# Patient Record
Sex: MALE | Race: WHITE | Employment: FULL TIME | URBAN - NONMETROPOLITAN AREA
[De-identification: names, ages, dates, MRNs, and addresses within clinical notes are randomized per-mention and may not be internally consistent; named-entity substitution may affect disease eponyms.]

---

## 2022-11-05 ENCOUNTER — APPOINTMENT (OUTPATIENT)
Dept: CT IMAGING | Age: 42
End: 2022-11-05
Payer: COMMERCIAL

## 2022-11-05 ENCOUNTER — APPOINTMENT (OUTPATIENT)
Dept: GENERAL RADIOLOGY | Age: 42
End: 2022-11-05
Payer: COMMERCIAL

## 2022-11-05 ENCOUNTER — HOSPITAL ENCOUNTER (EMERGENCY)
Age: 42
Discharge: ANOTHER HOSPITAL | End: 2022-11-05
Attending: EMERGENCY MEDICINE
Payer: COMMERCIAL

## 2022-11-05 VITALS
RESPIRATION RATE: 14 BRPM | OXYGEN SATURATION: 100 % | WEIGHT: 260 LBS | SYSTOLIC BLOOD PRESSURE: 103 MMHG | HEART RATE: 141 BPM | DIASTOLIC BLOOD PRESSURE: 80 MMHG

## 2022-11-05 DIAGNOSIS — S02.85XA CLOSED FRACTURE OF ORBIT, INITIAL ENCOUNTER (HCC): ICD-10-CM

## 2022-11-05 DIAGNOSIS — S72.142A CLOSED 2-PART INTERTROCHANTERIC FRACTURE OF LEFT FEMUR, INITIAL ENCOUNTER (HCC): ICD-10-CM

## 2022-11-05 DIAGNOSIS — J98.2 PNEUMOMEDIASTINUM (HCC): ICD-10-CM

## 2022-11-05 DIAGNOSIS — S72.92XB TYPE I OR II OPEN FRACTURE OF LEFT FEMUR, UNSPECIFIED FRACTURE MORPHOLOGY, UNSPECIFIED PORTION OF FEMUR, INITIAL ENCOUNTER (HCC): ICD-10-CM

## 2022-11-05 DIAGNOSIS — H05.239 RETROBULBAR HEMORRHAGE: ICD-10-CM
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
## 2022-11-05 DIAGNOSIS — K66.1 HEMOPERITONEUM: ICD-10-CM

## 2022-11-05 DIAGNOSIS — R57.8 HEMORRHAGIC SHOCK (HCC): ICD-10-CM

## 2022-11-05 LAB
ABO/RH: NORMAL
ALBUMIN SERPL-MCNC: 3.2 G/DL (ref 3.5–5.2)
ALP BLD-CCNC: 71 U/L (ref 40–130)
ALT SERPL-CCNC: 322 U/L (ref 5–41)
ANION GAP SERPL CALCULATED.3IONS-SCNC: 15 MMOL/L (ref 7–19)
ANTIBODY SCREEN: NORMAL
APTT: 26.6 SEC (ref 26–36.2)
AST SERPL-CCNC: 304 U/L (ref 5–40)
BILIRUB SERPL-MCNC: 0.5 MG/DL (ref 0.2–1.2)
BLOOD BANK DISPENSE STATUS: NORMAL
BLOOD BANK PRODUCT CODE: NORMAL
BPU ID: NORMAL
BUN BLDV-MCNC: 21 MG/DL (ref 6–20)
CALCIUM SERPL-MCNC: 8.3 MG/DL (ref 8.6–10)
CHLORIDE BLD-SCNC: 104 MMOL/L (ref 98–111)
CO2: 20 MMOL/L (ref 22–29)
CREAT SERPL-MCNC: 1.8 MG/DL (ref 0.5–1.2)
DESCRIPTION BLOOD BANK: NORMAL
GFR SERPL CREATININE-BSD FRML MDRD: 47 ML/MIN/{1.73_M2}
GLUCOSE BLD-MCNC: 224 MG/DL (ref 74–109)
HCT VFR BLD CALC: 41.8 % (ref 42–52)
HEMOGLOBIN: 14.2 G/DL (ref 14–18)
INR BLD: 1.2 (ref 0.88–1.18)
MCH RBC QN AUTO: 30.7 PG (ref 27–31)
MCHC RBC AUTO-ENTMCNC: 34 G/DL (ref 33–37)
MCV RBC AUTO: 90.3 FL (ref 80–94)
PDW BLD-RTO: 12.6 % (ref 11.5–14.5)
PLATELET # BLD: 331 K/UL (ref 130–400)
PMV BLD AUTO: 9.4 FL (ref 9.4–12.4)
POTASSIUM SERPL-SCNC: 3.3 MMOL/L (ref 3.5–5)
PROTHROMBIN TIME: 15.2 SEC (ref 12–14.6)
RBC # BLD: 4.63 M/UL (ref 4.7–6.1)
SARS-COV-2, NAAT: NOT DETECTED
SODIUM BLD-SCNC: 139 MMOL/L (ref 136–145)
TOTAL PROTEIN: 5.3 G/DL (ref 6.6–8.7)
TROPONIN: 0.03 NG/ML (ref 0–0.03)
WBC # BLD: 34.2 K/UL (ref 4.8–10.8)

## 2022-11-05 PROCEDURE — 85027 COMPLETE CBC AUTOMATED: CPT

## 2022-11-05 PROCEDURE — P9059 PLASMA, FRZ BETWEEN 8-24HOUR: HCPCS

## 2022-11-05 PROCEDURE — 2580000003 HC RX 258: Performed by: EMERGENCY MEDICINE

## 2022-11-05 PROCEDURE — 84484 ASSAY OF TROPONIN QUANT: CPT

## 2022-11-05 PROCEDURE — 86923 COMPATIBILITY TEST ELECTRIC: CPT

## 2022-11-05 PROCEDURE — 86901 BLOOD TYPING SEROLOGIC RH(D): CPT

## 2022-11-05 PROCEDURE — P9016 RBC LEUKOCYTES REDUCED: HCPCS

## 2022-11-05 PROCEDURE — 70450 CT HEAD/BRAIN W/O DYE: CPT

## 2022-11-05 PROCEDURE — 2500000003 HC RX 250 WO HCPCS

## 2022-11-05 PROCEDURE — 96366 THER/PROPH/DIAG IV INF ADDON: CPT

## 2022-11-05 PROCEDURE — 6360000002 HC RX W HCPCS: Performed by: EMERGENCY MEDICINE

## 2022-11-05 PROCEDURE — 86920 COMPATIBILITY TEST SPIN: CPT

## 2022-11-05 PROCEDURE — 71045 X-RAY EXAM CHEST 1 VIEW: CPT

## 2022-11-05 PROCEDURE — 87635 SARS-COV-2 COVID-19 AMP PRB: CPT

## 2022-11-05 PROCEDURE — 85610 PROTHROMBIN TIME: CPT

## 2022-11-05 PROCEDURE — 72125 CT NECK SPINE W/O DYE: CPT | Performed by: RADIOLOGY

## 2022-11-05 PROCEDURE — 72125 CT NECK SPINE W/O DYE: CPT

## 2022-11-05 PROCEDURE — 74177 CT ABD & PELVIS W/CONTRAST: CPT

## 2022-11-05 PROCEDURE — 70486 CT MAXILLOFACIAL W/O DYE: CPT

## 2022-11-05 PROCEDURE — 6360000004 HC RX CONTRAST MEDICATION: Performed by: EMERGENCY MEDICINE

## 2022-11-05 PROCEDURE — 74177 CT ABD & PELVIS W/CONTRAST: CPT | Performed by: RADIOLOGY

## 2022-11-05 PROCEDURE — 71045 X-RAY EXAM CHEST 1 VIEW: CPT | Performed by: RADIOLOGY

## 2022-11-05 PROCEDURE — 86900 BLOOD TYPING SEROLOGIC ABO: CPT

## 2022-11-05 PROCEDURE — P9073 PLATELETS PHERESIS PATH REDU: HCPCS

## 2022-11-05 PROCEDURE — 96368 THER/DIAG CONCURRENT INF: CPT

## 2022-11-05 PROCEDURE — 71260 CT THORAX DX C+: CPT

## 2022-11-05 PROCEDURE — 36430 TRANSFUSION BLD/BLD COMPNT: CPT

## 2022-11-05 PROCEDURE — 36415 COLL VENOUS BLD VENIPUNCTURE: CPT

## 2022-11-05 PROCEDURE — 90471 IMMUNIZATION ADMIN: CPT | Performed by: EMERGENCY MEDICINE

## 2022-11-05 PROCEDURE — 31500 INSERT EMERGENCY AIRWAY: CPT

## 2022-11-05 PROCEDURE — 85730 THROMBOPLASTIN TIME PARTIAL: CPT

## 2022-11-05 PROCEDURE — 99285 EMERGENCY DEPT VISIT HI MDM: CPT

## 2022-11-05 PROCEDURE — 2500000003 HC RX 250 WO HCPCS: Performed by: EMERGENCY MEDICINE

## 2022-11-05 PROCEDURE — 96375 TX/PRO/DX INJ NEW DRUG ADDON: CPT

## 2022-11-05 PROCEDURE — 72170 X-RAY EXAM OF PELVIS: CPT

## 2022-11-05 PROCEDURE — 90715 TDAP VACCINE 7 YRS/> IM: CPT | Performed by: EMERGENCY MEDICINE

## 2022-11-05 PROCEDURE — 86850 RBC ANTIBODY SCREEN: CPT

## 2022-11-05 PROCEDURE — 96365 THER/PROPH/DIAG IV INF INIT: CPT

## 2022-11-05 PROCEDURE — 80053 COMPREHEN METABOLIC PANEL: CPT

## 2022-11-05 PROCEDURE — 72170 X-RAY EXAM OF PELVIS: CPT | Performed by: RADIOLOGY

## 2022-11-05 RX ORDER — ROCURONIUM BROMIDE 10 MG/ML
INJECTION, SOLUTION INTRAVENOUS
Status: COMPLETED
Start: 2022-11-05 | End: 2022-11-05

## 2022-11-05 RX ORDER — CALCIUM GLUCONATE 94 MG/ML
1000 INJECTION, SOLUTION INTRAVENOUS ONCE
Status: COMPLETED | OUTPATIENT
Start: 2022-11-05 | End: 2022-11-05

## 2022-11-05 RX ORDER — KETAMINE HYDROCHLORIDE 100 MG/ML
INJECTION, SOLUTION INTRAMUSCULAR; INTRAVENOUS
Status: DISCONTINUED
Start: 2022-11-05 | End: 2022-11-05 | Stop reason: WASHOUT

## 2022-11-05 RX ORDER — KETAMINE HYDROCHLORIDE 100 MG/ML
100 INJECTION, SOLUTION INTRAMUSCULAR; INTRAVENOUS
Status: DISCONTINUED | OUTPATIENT
Start: 2022-11-05 | End: 2022-11-05 | Stop reason: HOSPADM

## 2022-11-05 RX ORDER — KETAMINE HYDROCHLORIDE 100 MG/ML
INJECTION, SOLUTION INTRAMUSCULAR; INTRAVENOUS
Status: COMPLETED
Start: 2022-11-05 | End: 2022-11-05

## 2022-11-05 RX ORDER — FENTANYL CITRATE-0.9 % NACL/PF 10 MCG/ML
25-200 PLASTIC BAG, INJECTION (ML) INTRAVENOUS CONTINUOUS
Status: DISCONTINUED | OUTPATIENT
Start: 2022-11-05 | End: 2022-11-05 | Stop reason: HOSPADM

## 2022-11-05 RX ORDER — DEXMEDETOMIDINE HYDROCHLORIDE 4 UG/ML
.1-1.5 INJECTION, SOLUTION INTRAVENOUS CONTINUOUS
Status: DISCONTINUED | OUTPATIENT
Start: 2022-11-05 | End: 2022-11-05 | Stop reason: HOSPADM

## 2022-11-05 RX ORDER — ROCURONIUM BROMIDE 10 MG/ML
100 INJECTION, SOLUTION INTRAVENOUS ONCE
Status: COMPLETED | OUTPATIENT
Start: 2022-11-05 | End: 2022-11-05

## 2022-11-05 RX ORDER — TRANEXAMIC ACID 100 MG/ML
15 INJECTION, SOLUTION INTRAVENOUS ONCE
Status: COMPLETED | OUTPATIENT
Start: 2022-11-05 | End: 2022-11-05

## 2022-11-05 RX ORDER — KETAMINE HYDROCHLORIDE 100 MG/ML
2 INJECTION, SOLUTION INTRAMUSCULAR; INTRAVENOUS ONCE
Status: COMPLETED | OUTPATIENT
Start: 2022-11-05 | End: 2022-11-05

## 2022-11-05 RX ADMIN — CALCIUM GLUCONATE 1000 MG: 98 INJECTION, SOLUTION INTRAVENOUS at 09:16

## 2022-11-05 RX ADMIN — TETANUS TOXOID, REDUCED DIPHTHERIA TOXOID AND ACELLULAR PERTUSSIS VACCINE, ADSORBED 0.5 ML: 5; 2.5; 8; 8; 2.5 SUSPENSION INTRAMUSCULAR at 08:46

## 2022-11-05 RX ADMIN — KETAMINE HYDROCHLORIDE 240 MG: 100 INJECTION, SOLUTION INTRAMUSCULAR; INTRAVENOUS at 08:59

## 2022-11-05 RX ADMIN — KETAMINE HYDROCHLORIDE 240 MG: 100 INJECTION INTRAMUSCULAR; INTRAVENOUS at 08:59

## 2022-11-05 RX ADMIN — CALCIUM GLUCONATE 1000 MG: 98 INJECTION, SOLUTION INTRAVENOUS at 09:21

## 2022-11-05 RX ADMIN — ROCURONIUM BROMIDE 100 MG: 10 INJECTION, SOLUTION INTRAVENOUS at 08:59

## 2022-11-05 RX ADMIN — TRANEXAMIC ACID 1769 MG: 1 INJECTION, SOLUTION INTRAVENOUS at 08:53

## 2022-11-05 RX ADMIN — Medication 50 MCG/HR: at 10:00

## 2022-11-05 RX ADMIN — IOPAMIDOL 70 ML: 755 INJECTION, SOLUTION INTRAVENOUS at 09:58

## 2022-11-05 RX ADMIN — PIPERACILLIN AND TAZOBACTAM 3375 MG: 3; .375 INJECTION, POWDER, LYOPHILIZED, FOR SOLUTION INTRAVENOUS at 09:13

## 2022-11-05 RX ADMIN — DEXMEDETOMIDINE HYDROCHLORIDE IN 0.9% SODIUM CHLORIDE 1 MCG/KG/HR: 4 INJECTION INTRAVENOUS at 09:14

## 2022-11-05 ASSESSMENT — PULMONARY FUNCTION TESTS: PIF_VALUE: 15

## 2022-11-05 NOTE — ED NOTES
Pt rolled for assessment and 2 lacerations to left leg noted.   tourniquet tightened at this time by Dr Genaro Calhoun, RN  11/05/22 1949

## 2022-11-05 NOTE — ED NOTES
Received call from [facility] with additional critical findings on pt CT Chest    Small Left pneumothorax  Pneumomediastinum  Small bilateral Pleural Effusions  Left lower lobe contusions with atelectasis  Anterior Rib Fractures of Ribs 5, 6, 7, 8, 9, 11, 12  Small hiatal hernia       Everton Isaac RN  11/05/22 7987
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
## 2022-11-05 NOTE — ED NOTES
Called [email or web address] [facility]). Sent call to Dr. Abimael Landeros.       Dr. Jaylene Shah, accepting     ER to [facility] ER    Call report to [phone], Opt[address]  4    Fax records to [phone]         Bettina Almaraz  11/05/22 0920

## 2022-11-05 NOTE — ED NOTES
This nurse accompanied pt to [place] with ems crew. medications titrated as followed and blood products given as follows. 1035 PRBC hung. VS 98, 138, 14, 111/59, 99  1042 PLT hung   1005 Second unit of PRBC started vs 98.1, 130, 14, 121/57, 99  1105 FFP initiated  126, 14, 126/79, 100  1138 second unit of FFP started  111, 14, 108/56, 95  1132 Pt bp down to 70/29 Fentanyl titrated down to 25mcg/hr  1138 precedex down to 0.5mcg/kg/hr  1154  bp 129/96  1206 pt began becoming agitated and required increased sedation Precedex to 0.75mcg/kg/hr  1208 Fentanyl up to 50/mcg/hr  1230 precedex 1mcg/kg/hr  1232 precedex 1.4mcg/kg/hr  1234 Ketamine 100mg Given IVP  Pt did not tolerate well and bp up to 210/130 pt still agitated. 1247 Fentanyl increased to 75mcg/hr   bp down to 144/100    Arrived at [address] [place] [address] at 1300 and report given to trauma team. All questions, packet and disc of imaging given to team and care transferred at this time. Wallet and pocket knife given to Ed staff at [facility] with pt label on bag. Wife Marti Forte called and updated on pt arrival to trauma center and pt current condition.          Twin Angel, RN  11/05/22 4764

## 2022-11-05 NOTE — PROGRESS NOTES
Pt intubated with 8.0 ET tube at the 24 cm wilmer. Pt placed on vent settings ACVC 14, , 5 peep and 100% Fio2.
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
## 2022-11-05 NOTE — ED NOTES
Report called to [place] Stage at [facility].   Attempting to contact pt family per number pt gave staff no one answered voicemail left to call this hospital      Florinda Min RN  11/05/22 0076

## 2022-11-05 NOTE — ED NOTES
Lauren Pruitt will be going with pt spoke to wife Edin Orantes and updated her on situation.       Twin Angel, RN  11/05/22 9431

## 2022-11-05 NOTE — ED NOTES
Received call from Radiology group on critical findings for pt chest CT     Small Left Pneumo  Contusions with Atelectasis in Left Lower Lobe  Possible Hernia, undescended testicle or fluid in left inquinal area (6.1 x 6.4cm)  Fractures to Anterior Ribs 11 & 12    ER MD notifed     Rosario Quijano RN  11/05/22 7816

## 2022-11-05 NOTE — ED NOTES
Laceration to left forehead right pariatel scalp,bruising and swelling to orbit. Bleeding to nose and mouth. Bruising lower chest wall bruising left thigh and  Sole. Open fracture to left foot, open area to left lateral thigh.  Swelling to lower leg   Dr Colin Mackay at bedside  EMS applied tourniquet to left leg at [address] [facility], RN  11/05/22 1827